# Patient Record
Sex: FEMALE | Race: WHITE | Employment: FULL TIME | URBAN - METROPOLITAN AREA
[De-identification: names, ages, dates, MRNs, and addresses within clinical notes are randomized per-mention and may not be internally consistent; named-entity substitution may affect disease eponyms.]

---

## 2017-03-22 ENCOUNTER — OFFICE VISIT (OUTPATIENT)
Dept: OBGYN CLINIC | Facility: CLINIC | Age: 37
End: 2017-03-22

## 2017-03-22 VITALS
HEIGHT: 66 IN | DIASTOLIC BLOOD PRESSURE: 66 MMHG | WEIGHT: 127.88 LBS | SYSTOLIC BLOOD PRESSURE: 110 MMHG | BODY MASS INDEX: 20.55 KG/M2

## 2017-03-22 DIAGNOSIS — Z30.431 IUD CHECK UP: ICD-10-CM

## 2017-03-22 DIAGNOSIS — Z01.419 ENCOUNTER FOR GYNECOLOGICAL EXAMINATION WITHOUT ABNORMAL FINDING: Primary | ICD-10-CM

## 2017-03-22 DIAGNOSIS — R68.82 DECREASED LIBIDO: ICD-10-CM

## 2017-03-22 DIAGNOSIS — Z12.4 ROUTINE CERVICAL SMEAR: ICD-10-CM

## 2017-03-22 PROCEDURE — 99395 PREV VISIT EST AGE 18-39: CPT | Performed by: OBSTETRICS & GYNECOLOGY

## 2017-03-22 PROCEDURE — 87624 HPV HI-RISK TYP POOLED RSLT: CPT | Performed by: OBSTETRICS & GYNECOLOGY

## 2017-03-22 PROCEDURE — 88175 CYTOPATH C/V AUTO FLUID REDO: CPT | Performed by: OBSTETRICS & GYNECOLOGY

## 2017-03-22 NOTE — PROGRESS NOTES
GYN H&P     3/22/2017  10:54 AM    CC:Patient presents for annual exam    HPI: Patient is a 31year old  here for annual gyn exam with concerns about decreased libido every since birth of last child two years ago.  Lack of desire and decreased sexual Activity: Yes    Birth Control/ Protection: Mirena    Comment: Mirena iud inserted 07/14/2015     Other Topics Concern   None on file     Social History Narrative   None on file       ROS:     Review of Systems:  General: no complaints  Eyes: no complaints Dna  High Risk , Thin Prep Collect    2. IUD check up  - Normal position    3. Routine cervical smear  - ThinPrep PAP Smear  - Hpv Dna  High Risk , Thin Prep Collect    4.  Decreased libido  - Referral given for consultation with Dieter Mina at [facility]

## 2017-03-24 LAB — HPV I/H RISK 1 DNA SPEC QL NAA+PROBE: NEGATIVE

## 2018-03-01 ENCOUNTER — LAB ENCOUNTER (OUTPATIENT)
Dept: LAB | Age: 38
End: 2018-03-01
Attending: PHYSICIAN ASSISTANT
Payer: COMMERCIAL

## 2018-03-01 DIAGNOSIS — L70.0 ACNE VULGARIS: ICD-10-CM

## 2018-03-01 PROCEDURE — 87070 CULTURE OTHR SPECIMN AEROBIC: CPT

## 2018-03-01 PROCEDURE — 87205 SMEAR GRAM STAIN: CPT

## 2018-03-01 PROCEDURE — 87077 CULTURE AEROBIC IDENTIFY: CPT

## 2018-03-05 NOTE — PROGRESS NOTES
Notified pt of results/Heather's message below. Pt verbalized understanding and had no further questions or concerns.

## 2018-05-04 ENCOUNTER — OFFICE VISIT (OUTPATIENT)
Dept: OBGYN CLINIC | Facility: CLINIC | Age: 38
End: 2018-05-04

## 2018-05-04 VITALS
DIASTOLIC BLOOD PRESSURE: 60 MMHG | HEIGHT: 66 IN | BODY MASS INDEX: 21.68 KG/M2 | WEIGHT: 134.88 LBS | SYSTOLIC BLOOD PRESSURE: 110 MMHG

## 2018-05-04 DIAGNOSIS — Z30.431 ENCOUNTER FOR ROUTINE CHECKING OF INTRAUTERINE CONTRACEPTIVE DEVICE (IUD): ICD-10-CM

## 2018-05-04 DIAGNOSIS — Z01.419 WOMEN'S ANNUAL ROUTINE GYNECOLOGICAL EXAMINATION: Primary | ICD-10-CM

## 2018-05-04 PROCEDURE — 99395 PREV VISIT EST AGE 18-39: CPT | Performed by: OBSTETRICS & GYNECOLOGY

## 2018-05-04 RX ORDER — IVERMECTIN 10 MG/G
CREAM TOPICAL
COMMUNITY
End: 2019-05-17

## 2018-05-04 NOTE — PROGRESS NOTES
GYN ANNUAL    2018  11:15 AM    CC:Patient presents for annual exam    HPI: Patient is a 30year old   here for annual gyn exam. Cycles minimal on Mirena. No pelvic pain. No abnormal vaginal discharge or bleeding.        OB History    Para Sexual activity: Yes    Birth control/ protection: Mirena    Comment: Mirena iud inserted 07/14/2015     Other Topics Concern   None on file     Social History Narrative   None on file       ROS:     Review of Systems:  General: no complaints  Eyes: no position          5/4/2018  Rogelio Lorenzana MD

## 2018-12-13 ENCOUNTER — HOSPITAL ENCOUNTER (EMERGENCY)
Facility: HOSPITAL | Age: 38
Discharge: HOME | End: 2018-12-13
Attending: PHYSICIAN ASSISTANT
Payer: COMMERCIAL

## 2018-12-13 VITALS
OXYGEN SATURATION: 100 % | BODY MASS INDEX: 22.49 KG/M2 | DIASTOLIC BLOOD PRESSURE: 57 MMHG | RESPIRATION RATE: 20 BRPM | TEMPERATURE: 98 F | HEART RATE: 92 BPM | HEIGHT: 65 IN | WEIGHT: 135 LBS | SYSTOLIC BLOOD PRESSURE: 113 MMHG

## 2018-12-13 DIAGNOSIS — R10.84 ABDOMINAL PAIN, GENERALIZED: ICD-10-CM

## 2018-12-13 DIAGNOSIS — R19.7 NAUSEA VOMITING AND DIARRHEA: Primary | ICD-10-CM

## 2018-12-13 DIAGNOSIS — R11.2 NAUSEA VOMITING AND DIARRHEA: Primary | ICD-10-CM

## 2018-12-13 PROCEDURE — 80076 HEPATIC FUNCTION PANEL: CPT | Performed by: PHYSICIAN ASSISTANT

## 2018-12-13 PROCEDURE — 96374 THER/PROPH/DIAG INJ IV PUSH: CPT

## 2018-12-13 PROCEDURE — 87507 IADNA-DNA/RNA PROBE TQ 12-25: CPT | Performed by: PHYSICIAN ASSISTANT

## 2018-12-13 PROCEDURE — 85025 COMPLETE CBC W/AUTO DIFF WBC: CPT | Performed by: PHYSICIAN ASSISTANT

## 2018-12-13 PROCEDURE — 83690 ASSAY OF LIPASE: CPT | Performed by: PHYSICIAN ASSISTANT

## 2018-12-13 PROCEDURE — 81001 URINALYSIS AUTO W/SCOPE: CPT | Performed by: PHYSICIAN ASSISTANT

## 2018-12-13 PROCEDURE — 99284 EMERGENCY DEPT VISIT MOD MDM: CPT

## 2018-12-13 PROCEDURE — 80048 BASIC METABOLIC PNL TOTAL CA: CPT | Performed by: PHYSICIAN ASSISTANT

## 2018-12-13 PROCEDURE — 96361 HYDRATE IV INFUSION ADD-ON: CPT

## 2018-12-13 PROCEDURE — 81025 URINE PREGNANCY TEST: CPT

## 2018-12-13 RX ORDER — ONDANSETRON 4 MG/1
4 TABLET, ORALLY DISINTEGRATING ORAL EVERY 6 HOURS PRN
Qty: 10 TABLET | Refills: 0 | Status: SHIPPED | OUTPATIENT
Start: 2018-12-13 | End: 2018-12-16

## 2018-12-13 RX ORDER — ONDANSETRON 2 MG/ML
4 INJECTION INTRAMUSCULAR; INTRAVENOUS ONCE
Status: COMPLETED | OUTPATIENT
Start: 2018-12-13 | End: 2018-12-13

## 2018-12-13 NOTE — ED NOTES
Pt states today began having nausea and vomiting x 3. States unable to keep even fluids down. States having RUQ Abd pain. States was diagnosed with shingles last week. Also states  was having same type of symptoms but not as severe.

## 2018-12-14 NOTE — ED PROVIDER NOTES
Patient Seen in: [facility] AND [facility] Emergency Department    History   Patient presents with:  Nausea/Vomiting/Diarrhea (gastrointestinal)    Stated Complaint: dehydration     HPI    Kristy Garibay is a 41year old female who presents with chief complaint of HPI.    Physical Exam     ED Triage Vitals   BP 12/13/18 1542 109/58   Pulse 12/13/18 1542 105   Resp 12/13/18 1542 20   Temp 12/13/18 1542 98.2 °F (36.8 °C)   Temp src 12/13/18 1542 Temporal   SpO2 12/13/18 1542 98 %   O2 Device 12/13/18 1849 None (Room a Glucose 111 (*)     CO2 21 (*)     All other components within normal limits   URINALYSIS WITH CULTURE REFLEX - Abnormal; Notable for the following components:    Clarity Urine Hazy (*)     Ketones Urine 80  (*)     Blood Urine Small (*)     Bacteria Ur three months to obtain basic health screening including reassessment of your blood pressure.     Medications Prescribed:  Current Discharge Medication List    START taking these medications    ondansetron 4 MG Oral Tablet Dispersible  Take 1 tablet (4 mg to

## 2019-05-17 ENCOUNTER — OFFICE VISIT (OUTPATIENT)
Dept: OBGYN CLINIC | Facility: CLINIC | Age: 39
End: 2019-05-17
Payer: COMMERCIAL

## 2019-05-17 ENCOUNTER — LAB ENCOUNTER (OUTPATIENT)
Dept: LAB | Age: 39
End: 2019-05-17
Attending: PHYSICIAN ASSISTANT
Payer: COMMERCIAL

## 2019-05-17 VITALS
SYSTOLIC BLOOD PRESSURE: 112 MMHG | HEIGHT: 66 IN | WEIGHT: 136 LBS | BODY MASS INDEX: 21.86 KG/M2 | DIASTOLIC BLOOD PRESSURE: 68 MMHG

## 2019-05-17 DIAGNOSIS — Z12.4 SCREENING FOR MALIGNANT NEOPLASM OF THE CERVIX: ICD-10-CM

## 2019-05-17 DIAGNOSIS — Z01.419 WOMEN'S ANNUAL ROUTINE GYNECOLOGICAL EXAMINATION: Primary | ICD-10-CM

## 2019-05-17 DIAGNOSIS — Z30.431 ENCOUNTER FOR ROUTINE CHECKING OF INTRAUTERINE CONTRACEPTIVE DEVICE (IUD): ICD-10-CM

## 2019-05-17 DIAGNOSIS — A49.9 BACTERIAL INFECTION: ICD-10-CM

## 2019-05-17 PROCEDURE — 99395 PREV VISIT EST AGE 18-39: CPT | Performed by: OBSTETRICS & GYNECOLOGY

## 2019-05-17 PROCEDURE — 87077 CULTURE AEROBIC IDENTIFY: CPT

## 2019-05-17 PROCEDURE — 87070 CULTURE OTHR SPECIMN AEROBIC: CPT

## 2019-05-17 PROCEDURE — 87205 SMEAR GRAM STAIN: CPT

## 2019-05-17 PROCEDURE — 87624 HPV HI-RISK TYP POOLED RSLT: CPT | Performed by: OBSTETRICS & GYNECOLOGY

## 2019-05-17 NOTE — PROGRESS NOTES
GYN ANNUAL    2019  1:03 PM    CC:Patient presents for exam    HPI: Patient is a 41year old  LMP 2019 here for annual gyn exam and PAP. Cycles light on Mirena placed . No pelvic pain. No abnormal vaginal discharge or bleeding.        O Not on file      ROS:     Review of Systems:  General: no complaints  Eyes: no complaints  Respiratory: no complaints  Cardiovascular: no complaints  GI: denies abdominal pain, diarrhea, constipation  : no complaints, denies dysuria, increased urinary fr

## 2019-05-20 NOTE — PROGRESS NOTES
LMOM ok per hipaa informing of results and Heather's message below. Advised pt to call back with any questions or concerns. Escribed.

## 2020-07-27 ENCOUNTER — OFFICE VISIT (OUTPATIENT)
Dept: OBGYN CLINIC | Facility: CLINIC | Age: 40
End: 2020-07-27
Payer: COMMERCIAL

## 2020-07-27 VITALS
WEIGHT: 132.81 LBS | HEIGHT: 66 IN | DIASTOLIC BLOOD PRESSURE: 76 MMHG | SYSTOLIC BLOOD PRESSURE: 120 MMHG | BODY MASS INDEX: 21.34 KG/M2

## 2020-07-27 DIAGNOSIS — Z30.433 ENCOUNTER FOR REMOVAL AND REINSERTION OF IUD: ICD-10-CM

## 2020-07-27 DIAGNOSIS — Z01.419 WOMEN'S ANNUAL ROUTINE GYNECOLOGICAL EXAMINATION: Primary | ICD-10-CM

## 2020-07-27 PROCEDURE — 3078F DIAST BP <80 MM HG: CPT | Performed by: OBSTETRICS & GYNECOLOGY

## 2020-07-27 PROCEDURE — 58301 REMOVE INTRAUTERINE DEVICE: CPT | Performed by: OBSTETRICS & GYNECOLOGY

## 2020-07-27 PROCEDURE — 3074F SYST BP LT 130 MM HG: CPT | Performed by: OBSTETRICS & GYNECOLOGY

## 2020-07-27 PROCEDURE — 99395 PREV VISIT EST AGE 18-39: CPT | Performed by: OBSTETRICS & GYNECOLOGY

## 2020-07-27 PROCEDURE — 58300 INSERT INTRAUTERINE DEVICE: CPT | Performed by: OBSTETRICS & GYNECOLOGY

## 2020-07-27 PROCEDURE — 3008F BODY MASS INDEX DOCD: CPT | Performed by: OBSTETRICS & GYNECOLOGY

## 2020-07-27 NOTE — PROCEDURES
[address] [place] [address]  Obstetrics and Gynecology  IUD Removal and IUD Insertion Procedure Note  Lis Hughes MD    Maya Barnett is a 47year old female presenting for IUD Removal and Insertion.      Pelvic Exam Findings:  Cervix normal, small

## 2020-07-27 NOTE — PROGRESS NOTES
GYN ANNUAL    2020  9:59 AM    Patient presents with: Annual: Annual   .    HPI: Patient is a 47year old  LMP 20 presents for annual gyn exam. Cycles stable on Mirena which  this month. Would like to replace it today.  No pelvic pa Number of children: Not on file      Years of education: Not on file      Highest education level: Not on file    Tobacco Use      Smoking status: Never Smoker      Smokeless tobacco: Never Used    Substance and Sexual Activity      Alcohol use:  Yes masses  Cul-de-sac: normal  R/V: normal perineum, no hemorrhoids  EXTREMITIES: nontender without edema        A/P: Patient is 47year old female here for well-woman exam.     1. Women's annual gynecologic exam  - Normal exam    2.  Encounter for removal and

## 2022-10-31 ENCOUNTER — OFFICE VISIT (OUTPATIENT)
Dept: ALLERGY | Facility: CLINIC | Age: 42
End: 2022-10-31
Payer: COMMERCIAL

## 2022-10-31 ENCOUNTER — NURSE ONLY (OUTPATIENT)
Dept: ALLERGY | Facility: CLINIC | Age: 42
End: 2022-10-31
Payer: COMMERCIAL

## 2022-10-31 ENCOUNTER — LAB ENCOUNTER (OUTPATIENT)
Dept: LAB | Age: 42
End: 2022-10-31
Attending: ALLERGY & IMMUNOLOGY
Payer: COMMERCIAL

## 2022-10-31 VITALS
BODY MASS INDEX: 21.69 KG/M2 | SYSTOLIC BLOOD PRESSURE: 122 MMHG | HEART RATE: 78 BPM | OXYGEN SATURATION: 97 % | WEIGHT: 135 LBS | DIASTOLIC BLOOD PRESSURE: 70 MMHG | HEIGHT: 66 IN

## 2022-10-31 DIAGNOSIS — Z92.29 COVID-19 VACCINE SERIES COMPLETED: ICD-10-CM

## 2022-10-31 DIAGNOSIS — T78.1XXA ADVERSE FOOD REACTION, INITIAL ENCOUNTER: ICD-10-CM

## 2022-10-31 DIAGNOSIS — R19.7 DIARRHEA, UNSPECIFIED TYPE: ICD-10-CM

## 2022-10-31 DIAGNOSIS — R19.7 DIARRHEA, UNSPECIFIED TYPE: Primary | ICD-10-CM

## 2022-10-31 DIAGNOSIS — Z91.018 FOOD ALLERGY: ICD-10-CM

## 2022-10-31 DIAGNOSIS — J30.2 SEASONAL AND PERENNIAL ALLERGIC RHINOCONJUNCTIVITIS: ICD-10-CM

## 2022-10-31 DIAGNOSIS — Z23 FLU VACCINE NEED: ICD-10-CM

## 2022-10-31 DIAGNOSIS — R10.84 GENERALIZED ABDOMINAL PAIN: ICD-10-CM

## 2022-10-31 DIAGNOSIS — H10.10 SEASONAL AND PERENNIAL ALLERGIC RHINOCONJUNCTIVITIS: ICD-10-CM

## 2022-10-31 DIAGNOSIS — J30.89 SEASONAL AND PERENNIAL ALLERGIC RHINOCONJUNCTIVITIS: ICD-10-CM

## 2022-10-31 LAB — IGA SERPL-MCNC: 197 MG/DL (ref 70–312)

## 2022-10-31 PROCEDURE — 36415 COLL VENOUS BLD VENIPUNCTURE: CPT

## 2022-10-31 PROCEDURE — 3008F BODY MASS INDEX DOCD: CPT | Performed by: ALLERGY & IMMUNOLOGY

## 2022-10-31 PROCEDURE — 3078F DIAST BP <80 MM HG: CPT | Performed by: ALLERGY & IMMUNOLOGY

## 2022-10-31 PROCEDURE — 86364 TISS TRNSGLTMNASE EA IG CLAS: CPT

## 2022-10-31 PROCEDURE — 3074F SYST BP LT 130 MM HG: CPT | Performed by: ALLERGY & IMMUNOLOGY

## 2022-10-31 PROCEDURE — 95004 PERQ TESTS W/ALRGNC XTRCS: CPT | Performed by: ALLERGY & IMMUNOLOGY

## 2022-10-31 PROCEDURE — 99204 OFFICE O/P NEW MOD 45 MIN: CPT | Performed by: ALLERGY & IMMUNOLOGY

## 2022-10-31 PROCEDURE — 82784 ASSAY IGA/IGD/IGG/IGM EACH: CPT

## 2022-10-31 RX ORDER — SULFACETAMIDE SODIUM, SULFUR 100; 50 MG/G; MG/G
1 LOTION TOPICAL AS DIRECTED
COMMUNITY
Start: 2022-06-21

## 2022-10-31 RX ORDER — DOXYCYCLINE HYCLATE 100 MG/1
100 CAPSULE ORAL 2 TIMES DAILY
COMMUNITY
Start: 2022-06-17

## 2022-10-31 NOTE — PATIENT INSTRUCTIONS
#1 diarrhea and abdominal pain  Check celiac panel. Handouts on FODMAP diet provided and reviewed  See above skin testing to foods in question based on clinical history to screen for an IgE mediated process. Recommend avoiding foods that are positive on skin testing  Recommend GI evaluation if symptoms persist given family history of ulcerative colitis with her father. TSH was normal    #2 Food allergies  See above skin testing to select foods based on clinical history to screen for an IgE mediated process  Recommend to avoid those foods that are positive on skin testing. Handouts on food allergies versus food intolerances provided and reviewed  Check celiac panel    #3 allergic rhinitis  Defers testing at this time.   Reviewed avoidance measures and potential treatment option of immunotherapy  May consider Zyrtec 10 mg or Xyzal 5 mg as an antihistamine if having significant runny nose sneezing itchy watery eyes  Add Flonase or Nasacort 2 sprays per nostril once a day if having problem nasal congestion postnasal drip    #4 COVID vaccinations up-to-date    #5 recommend flu vaccine this fall

## 2022-11-03 LAB — TTG IGA SER-ACNC: 0.6 U/ML (ref ?–7)

## 2022-11-05 ENCOUNTER — TELEPHONE (OUTPATIENT)
Dept: ALLERGY | Facility: CLINIC | Age: 42
End: 2022-11-05

## 2022-11-05 NOTE — TELEPHONE ENCOUNTER
----- Message from Renetta Linda MD sent at 11/1/2022  7:55 AM CDT -----  Please contact patient with normal IgA level of 197  Celiac panel still pending

## 2022-11-05 NOTE — TELEPHONE ENCOUNTER
----- Message from Cuong Gottlieb MD sent at 11/3/2022  8:19 AM CDT -----  Please call patient with negative celiac panel.   This is good news

## 2022-11-18 ENCOUNTER — OFFICE VISIT (OUTPATIENT)
Dept: SURGERY | Facility: CLINIC | Age: 42
End: 2022-11-18
Payer: COMMERCIAL

## 2022-11-18 VITALS — WEIGHT: 132.38 LBS | HEIGHT: 66 IN | BODY MASS INDEX: 21.28 KG/M2

## 2022-11-18 DIAGNOSIS — Z30.431 ENCOUNTER FOR ROUTINE CHECKING OF INTRAUTERINE CONTRACEPTIVE DEVICE (IUD): ICD-10-CM

## 2022-11-18 DIAGNOSIS — Z01.419 WOMEN'S ANNUAL ROUTINE GYNECOLOGICAL EXAMINATION: ICD-10-CM

## 2022-11-18 DIAGNOSIS — Z12.31 BREAST CANCER SCREENING BY MAMMOGRAM: Primary | ICD-10-CM

## 2022-11-18 PROCEDURE — 87624 HPV HI-RISK TYP POOLED RSLT: CPT | Performed by: OBSTETRICS & GYNECOLOGY

## 2022-11-18 PROCEDURE — 99396 PREV VISIT EST AGE 40-64: CPT | Performed by: OBSTETRICS & GYNECOLOGY

## 2022-11-18 PROCEDURE — 3008F BODY MASS INDEX DOCD: CPT | Performed by: OBSTETRICS & GYNECOLOGY

## 2022-11-21 LAB — HPV I/H RISK 1 DNA SPEC QL NAA+PROBE: NEGATIVE

## 2025-02-28 ENCOUNTER — OFFICE VISIT (OUTPATIENT)
Dept: SURGERY | Facility: CLINIC | Age: 45
End: 2025-02-28
Payer: COMMERCIAL

## 2025-02-28 VITALS
DIASTOLIC BLOOD PRESSURE: 60 MMHG | WEIGHT: 137.81 LBS | HEIGHT: 66 IN | SYSTOLIC BLOOD PRESSURE: 108 MMHG | BODY MASS INDEX: 22.15 KG/M2

## 2025-02-28 DIAGNOSIS — Z12.31 BREAST CANCER SCREENING BY MAMMOGRAM: ICD-10-CM

## 2025-02-28 DIAGNOSIS — Z97.5 PRESENCE OF MIRENA IUD: ICD-10-CM

## 2025-02-28 DIAGNOSIS — Z01.419 WOMEN'S ANNUAL ROUTINE GYNECOLOGICAL EXAMINATION: ICD-10-CM

## 2025-02-28 DIAGNOSIS — Z12.4 ROUTINE CERVICAL SMEAR: Primary | ICD-10-CM

## 2025-02-28 PROCEDURE — 87624 HPV HI-RISK TYP POOLED RSLT: CPT | Performed by: OBSTETRICS & GYNECOLOGY

## 2025-02-28 PROCEDURE — 3074F SYST BP LT 130 MM HG: CPT | Performed by: OBSTETRICS & GYNECOLOGY

## 2025-02-28 PROCEDURE — 99459 PELVIC EXAMINATION: CPT | Performed by: OBSTETRICS & GYNECOLOGY

## 2025-02-28 PROCEDURE — 3078F DIAST BP <80 MM HG: CPT | Performed by: OBSTETRICS & GYNECOLOGY

## 2025-02-28 PROCEDURE — 3008F BODY MASS INDEX DOCD: CPT | Performed by: OBSTETRICS & GYNECOLOGY

## 2025-02-28 PROCEDURE — 99396 PREV VISIT EST AGE 40-64: CPT | Performed by: OBSTETRICS & GYNECOLOGY

## 2025-02-28 NOTE — PROGRESS NOTES
GYN ANNUAL    2025  12:03 PM    Chief Complaint   Patient presents with    Annual     Annual exam and pap     Mammogram Screening     Needs mammo order, needs order for [facility]     Physical     Reviewed Preventative/Wellness form with patient.    .    HPI: Patient is a 44 year old  LMP 25 presents for annual gyn exam - due for PAP and mammogram. Reports no gynecologic issues or complaints. No pelvic pain. No abnormal vaginal discharge or bleeding.     OB History    Para Term  AB Living   2 2 2     4   SAB IAB Ectopic Multiple Live Births           2      # Outcome Date GA Lbr Alfred/2nd Weight Sex Type Anes PTL Lv   2 Term 04/30/15 39w6d  6 lb 14 oz (3.118 kg) F NORMAL SPONT EPI  YING   1 Term 13 40w1d  7 lb 11 oz (3.487 kg) F NORMAL SPONT EPI  YING         GYN hx:    Hx Prior Abnormal Pap: Yes (-Ascus )  Pap Date: 22  Pap Result Notes: wnl/-hpv  Follow Up Recommendation: Mammogram -10/31/22 at [facility], appt sceduled for 25 at [facility]  CONTRACEPTION: Mirena  LAST MAMMOGRAM: 10/2022      No current outpatient medications on file.       Past Medical History:    Allergic rhinitis    ASCUS of cervix with negative high risk HPV    History of use of contraceptive intrauterine device (IUD)    Mirena #1 removed /Mirena # 2 inserted     Past Surgical History:   Procedure Laterality Date    Colposcopy, cervix w/upper adjacent vagina; w/biopsy(s), cervix  2010    Insert intrauterine device  2020    Mirena #2     Allergies[1]  Family History   Problem Relation Age of Onset    Other (Other) Father         COPD    Asthma Father     Diabetes Mother     Colon Polyps Paternal Aunt     Colon Cancer Paternal Cousin     Breast Cancer Neg     Ovarian Cancer Neg     Uterine Cancer Neg      Social History     Socioeconomic History    Marital status:    Tobacco Use    Smoking status: Never     Passive exposure: Never    Smokeless tobacco: Never   Substance and Sexual Activity     Alcohol use: Yes     Alcohol/week: 2.0 standard drinks of alcohol     Types: 2 Glasses of wine per week     Comment: SOCIALLY    Drug use: No    Sexual activity: Yes     Birth control/protection: Mirena     Comment: Mirena iud inserted 07/14/2015     Social History     Social History Narrative    Not on file       ROS:     Review of Systems:  A comprehensive 10 point ROS was completed. All pertinent positives and negatives noted in the HPI        /60   Ht 66\"   Wt 137 lb 12.8 oz (62.5 kg)   LMP 02/25/2025 (Exact Date)   BMI 22.24 kg/m²     Exam:   GENERAL: well developed, well nourished, in no apparent distress  SKIN: no rashes, no lesions  HEENT: normal  LUNGS: respiration unlabored  CARDIOVASCULAR: no peripheral edema or varicosities, skin warm and dry  BREASTS: bilaterally nontender, no palpable masses, no nipple discharge, no skin changes, no axillary adenopathy  ABDOMEN: Soft, non distended; non tender, no masses  GYNE/:   External Genitalia: normal, no lesions, good perineal support  Urethra: meatus normal  Bladder: well supported  Vagina: normal mucosa, no lesions, no discharge   Uterus: normal size, mobile, nontender  Cervix: IUD string 3 cm at normal os, no lesions or bleeding  Adnexa: normal size, bilaterally nontender, no palpable masses  Cul-de-sac: normal  R/V: normal perineum, no hemorrhoids  EXTREMITIES: nontender without edema      A/P: Patient is 44 year old female here for well-woman exam.     1. Women's annual routine gynecological examination  - PAP today  - Mammogram ordered    2. Presence of Mirena IUD  - Normal position      2/28/2025  Vanessa Watkins MD                    [1]   Allergies  Allergen Reactions    Dairy Products NAUSEA AND VOMITING    Lactose DIARRHEA     Other reaction(s): Other  Severe stomach pain cramping and diarrhea    Shellfish NAUSEA ONLY

## (undated) NOTE — MR AVS SNAPSHOT
After Visit Summary   3/22/2017    Tori Hernandez    MRN: [id number]           Visit Information        Provider Department Dept Phone    3/22/2017  8:30 AM Nikki Saunders MD [facility]  [phone]      Your Vitals Were     BP Ht Wt BMI LMP Expires: 5/21/2017  8:28 AM      Enter your Zip Code and Date of Birth (mm/dd/yyyy) as indicated and click Next. You will be taken to the next sign-up page. Create a [employer] Username.  This will be your [employer] login Username and cannot be changed, so th

## (undated) NOTE — MR AVS SNAPSHOT
[address] at [facility]  [address] [facility], [address] [place] [address]  [phone]               Thank you for choosing us for your health care visit with Ludy Osorio MD.  We are glad to serve you and happy to provide you wi THINPREP PAP SMEAR ONLY      Component Value Standard Range & Units    Case Report Gynecologic Cytology                              Case: [id number]                                  Authorizing Provider:  Irineo Wagner MD      Collected: Recommendations/Comments {\rtf1\ansi\wnlnuue1599\ftnbj\uc1 {\rtf1\wsoifb70604\ansi\xymngtc3491\ftnbj\uc1\deff0{\fonttbl{\f0 \fswiss MS Sans Serif;}{\f1 \fswiss \fcharset0 MS Sans Serif;}}{\colortbl ;\gao111\hhavf520\badc645 ;\red0\green0\blue0 ;}{\stylesh Font;}}{\*\revtbl{Unknown;}}\bplnvy30986\xudjvb95162\rjdan6910\thsrv4607\tvwvr7385\ykjjn9936\oledluy900\qfdjtiu116\nogrowautofit\etcebl886\formshade\nofeaturethrottle1\dntblnsbdb\fet4\aendnotes\aftnnrlc\pgbrdrhead\pgbrdrfoot\sectd\agerdu59814\bfaocm83840\g Enter your [employer] Activation Code exactly as it appears below along with your Zip Code and Date of Birth to complete the sign-up process. If you do not sign up before the expiration date, you must request a new code. Your unique [employer] Access Code:  [email or web address]

## (undated) NOTE — Clinical Note
03/27/2017    To: Jazmin Sanchez    Re: Test Results      Thank you for your recent visit to our office. We have received your test results, which were done on   03/22/2017.      PAP Test:    Your results are normal.        HPV (Human papillomavirus) Test (r

## (undated) NOTE — ED AVS SNAPSHOT
Ruddy Burch   MRN: [id number]    Department:  [facility] Emergency Department   Date of Visit:  12/13/2018           Disclosure     Insurance plans vary and the physician(s) referred by the ER may not be covered by your plan.  Please contact yo CARE PHYSICIAN AT ONCE OR RETURN IMMEDIATELY TO THE EMERGENCY DEPARTMENT. If you have been prescribed any medication(s), please fill your prescription right away and begin taking the medication(s) as directed.   If you believe that any of the medications

## (undated) NOTE — LETTER
Ken Mcarthur, :1980    CONSENT FOR PROCEDURE/SEDATION    1. I authorize the performance upon Ken Mcarthur  the following: Mirena IUD removal and reinsertion of Mirena IUD     2.  I authorize Dr. Milagros Carmichael MD (and whomever is designated Relationship to patient: ____________________________________________    Witness: _________________________________________ Date:___________     Physician Signature: _______________________________ Date:___________